# Patient Record
Sex: MALE
[De-identification: names, ages, dates, MRNs, and addresses within clinical notes are randomized per-mention and may not be internally consistent; named-entity substitution may affect disease eponyms.]

---

## 2018-04-09 ENCOUNTER — HOSPITAL ENCOUNTER (EMERGENCY)
Dept: HOSPITAL 92 - SCSER | Age: 23
Discharge: HOME | End: 2018-04-09
Payer: SELF-PAY
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
## 2018-04-09 DIAGNOSIS — S61.411A: Primary | ICD-10-CM

## 2018-04-09 PROCEDURE — 12001 RPR S/N/AX/GEN/TRNK 2.5CM/<: CPT

## 2018-04-17 ENCOUNTER — HOSPITAL ENCOUNTER (OUTPATIENT)
Dept: HOSPITAL 92 - SDC | Age: 23
Discharge: HOME | End: 2018-04-17
Attending: ORTHOPAEDIC SURGERY
Payer: COMMERCIAL

## 2018-04-17 DIAGNOSIS — F41.9: ICD-10-CM

## 2018-04-17 DIAGNOSIS — S66.326A: Primary | ICD-10-CM
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
## 2018-04-17 DIAGNOSIS — F17.200: ICD-10-CM

## 2018-04-17 PROCEDURE — 0KQC0ZZ REPAIR RIGHT HAND MUSCLE, OPEN APPROACH: ICD-10-PCS | Performed by: ORTHOPAEDIC SURGERY

## 2018-04-17 PROCEDURE — 96374 THER/PROPH/DIAG INJ IV PUSH: CPT

## 2018-04-17 PROCEDURE — S0020 INJECTION, BUPIVICAINE HYDRO: HCPCS

## 2018-04-18 NOTE — OP
PREOPERATIVE DIAGNOSIS:  Right extensor digitorum communis, small finger and right extensor digitorum
 minimi, small finger laceration.

 

POSTOPERATIVE DIAGNOSIS:  Right extensor digitorum communis, small finger and right extensor digitoru
m minimi, small finger laceration.

 

PROCEDURE PERFORMED:

1.  Repair extensor digitorum communis to the right small finger.

2.  Repair extensor digitorum minimi tendon, right small finger.

 

ESTIMATED BLOOD LOSS:  10 mL.

 

TOURNIQUET TIME:  30 minutes.

 

SURGEON:  Jayro Obrien M.D.

 

FINDINGS:  A 100% laceration both tendons with retraction.

 

DESCRIPTION OF PROCEDURE:  After successful general LMA technique, the patient had a 4 mm transverse 
incision with ____ and we extended the incision in a zigzag/Z fashion, 1 cm distal, 1 cm proximal.  H
ere, we saw there was completely cut both extensor tendons to the small finger.  We identified the te
ndons, including proximal and distal firmly and was approximately 1/2 mm, and then reapproximated the
 first extensor digitorum communis of the small finger using multiple figure-of-eight sutures buried 
inside the tendon with a 4-0 Prolene using a RB1 needle.

 

Using the same technique on the next smaller, but small extensor digitorum minimi tendon except there
 were less sutures.  We inflated the tourniquet.  Common extensor tendon, the PIP and MP joints were 
completely hyperextended indicative of appropriate tension and with flexion to 60 degrees.  There was
 no gap formation.

 

The patient then underwent tourniquet deflation.  Hemostasis was obtained and closed the wound with i
nterrupted 4-0 nylon in a simple pattern.  Bulky dressing was applied and a splint with out to the le
annabel of the PIP joint with the MP joint at -10 degrees full extension, PIP fully extended.  He had nor
mal refill and left the operating room without evidence of anesthetic or operative complication.